# Patient Record
Sex: MALE | Race: OTHER | HISPANIC OR LATINO | URBAN - METROPOLITAN AREA
[De-identification: names, ages, dates, MRNs, and addresses within clinical notes are randomized per-mention and may not be internally consistent; named-entity substitution may affect disease eponyms.]

---

## 2019-06-05 ENCOUNTER — EMERGENCY (EMERGENCY)
Facility: HOSPITAL | Age: 39
LOS: 1 days | Discharge: HOME | End: 2019-06-05
Attending: EMERGENCY MEDICINE | Admitting: EMERGENCY MEDICINE
Payer: COMMERCIAL

## 2019-06-05 VITALS
TEMPERATURE: 98 F | HEART RATE: 53 BPM | SYSTOLIC BLOOD PRESSURE: 137 MMHG | OXYGEN SATURATION: 98 % | DIASTOLIC BLOOD PRESSURE: 82 MMHG | RESPIRATION RATE: 17 BRPM

## 2019-06-05 VITALS
HEART RATE: 51 BPM | OXYGEN SATURATION: 97 % | SYSTOLIC BLOOD PRESSURE: 152 MMHG | TEMPERATURE: 98 F | DIASTOLIC BLOOD PRESSURE: 85 MMHG | WEIGHT: 162.92 LBS | RESPIRATION RATE: 18 BRPM

## 2019-06-05 DIAGNOSIS — N20.0 CALCULUS OF KIDNEY: ICD-10-CM

## 2019-06-05 DIAGNOSIS — R10.9 UNSPECIFIED ABDOMINAL PAIN: ICD-10-CM

## 2019-06-05 LAB
ALBUMIN SERPL ELPH-MCNC: 4.7 G/DL — SIGNIFICANT CHANGE UP (ref 3.3–5)
ALP SERPL-CCNC: 129 U/L — HIGH (ref 40–120)
ALT FLD-CCNC: 106 U/L — HIGH (ref 10–45)
ANION GAP SERPL CALC-SCNC: 11 MMOL/L — SIGNIFICANT CHANGE UP (ref 5–17)
APPEARANCE UR: CLEAR — SIGNIFICANT CHANGE UP
AST SERPL-CCNC: 49 U/L — HIGH (ref 10–40)
BASOPHILS # BLD AUTO: 0.04 K/UL — SIGNIFICANT CHANGE UP (ref 0–0.2)
BASOPHILS NFR BLD AUTO: 0.6 % — SIGNIFICANT CHANGE UP (ref 0–2)
BILIRUB SERPL-MCNC: 0.6 MG/DL — SIGNIFICANT CHANGE UP (ref 0.2–1.2)
BILIRUB UR-MCNC: NEGATIVE — SIGNIFICANT CHANGE UP
BUN SERPL-MCNC: 15 MG/DL — SIGNIFICANT CHANGE UP (ref 7–23)
CALCIUM SERPL-MCNC: 9.7 MG/DL — SIGNIFICANT CHANGE UP (ref 8.4–10.5)
CHLORIDE SERPL-SCNC: 106 MMOL/L — SIGNIFICANT CHANGE UP (ref 96–108)
CO2 SERPL-SCNC: 26 MMOL/L — SIGNIFICANT CHANGE UP (ref 22–31)
COLOR SPEC: YELLOW — SIGNIFICANT CHANGE UP
CREAT SERPL-MCNC: 1.04 MG/DL — SIGNIFICANT CHANGE UP (ref 0.5–1.3)
DIFF PNL FLD: ABNORMAL
EOSINOPHIL # BLD AUTO: 0.3 K/UL — SIGNIFICANT CHANGE UP (ref 0–0.5)
EOSINOPHIL NFR BLD AUTO: 4.3 % — SIGNIFICANT CHANGE UP (ref 0–6)
GLUCOSE SERPL-MCNC: 123 MG/DL — HIGH (ref 70–99)
GLUCOSE UR QL: NEGATIVE — SIGNIFICANT CHANGE UP
HCT VFR BLD CALC: 46 % — SIGNIFICANT CHANGE UP (ref 39–50)
HGB BLD-MCNC: 15.1 G/DL — SIGNIFICANT CHANGE UP (ref 13–17)
IMM GRANULOCYTES NFR BLD AUTO: 0.3 % — SIGNIFICANT CHANGE UP (ref 0–1.5)
KETONES UR-MCNC: ABNORMAL MG/DL
LEUKOCYTE ESTERASE UR-ACNC: ABNORMAL
LYMPHOCYTES # BLD AUTO: 1.88 K/UL — SIGNIFICANT CHANGE UP (ref 1–3.3)
LYMPHOCYTES # BLD AUTO: 27.1 % — SIGNIFICANT CHANGE UP (ref 13–44)
MCHC RBC-ENTMCNC: 29.7 PG — SIGNIFICANT CHANGE UP (ref 27–34)
MCHC RBC-ENTMCNC: 32.8 GM/DL — SIGNIFICANT CHANGE UP (ref 32–36)
MCV RBC AUTO: 90.6 FL — SIGNIFICANT CHANGE UP (ref 80–100)
MONOCYTES # BLD AUTO: 0.58 K/UL — SIGNIFICANT CHANGE UP (ref 0–0.9)
MONOCYTES NFR BLD AUTO: 8.4 % — SIGNIFICANT CHANGE UP (ref 2–14)
NEUTROPHILS # BLD AUTO: 4.11 K/UL — SIGNIFICANT CHANGE UP (ref 1.8–7.4)
NEUTROPHILS NFR BLD AUTO: 59.3 % — SIGNIFICANT CHANGE UP (ref 43–77)
NITRITE UR-MCNC: NEGATIVE — SIGNIFICANT CHANGE UP
NRBC # BLD: 0 /100 WBCS — SIGNIFICANT CHANGE UP (ref 0–0)
PH UR: 5.5 — SIGNIFICANT CHANGE UP (ref 5–8)
PLATELET # BLD AUTO: 291 K/UL — SIGNIFICANT CHANGE UP (ref 150–400)
POTASSIUM SERPL-MCNC: 4.2 MMOL/L — SIGNIFICANT CHANGE UP (ref 3.5–5.3)
POTASSIUM SERPL-SCNC: 4.2 MMOL/L — SIGNIFICANT CHANGE UP (ref 3.5–5.3)
PROT SERPL-MCNC: 8.2 G/DL — SIGNIFICANT CHANGE UP (ref 6–8.3)
PROT UR-MCNC: NEGATIVE MG/DL — SIGNIFICANT CHANGE UP
RBC # BLD: 5.08 M/UL — SIGNIFICANT CHANGE UP (ref 4.2–5.8)
RBC # FLD: 13 % — SIGNIFICANT CHANGE UP (ref 10.3–14.5)
SODIUM SERPL-SCNC: 143 MMOL/L — SIGNIFICANT CHANGE UP (ref 135–145)
SP GR SPEC: 1.02 — SIGNIFICANT CHANGE UP (ref 1–1.03)
UROBILINOGEN FLD QL: 0.2 E.U./DL — SIGNIFICANT CHANGE UP
WBC # BLD: 6.93 K/UL — SIGNIFICANT CHANGE UP (ref 3.8–10.5)
WBC # FLD AUTO: 6.93 K/UL — SIGNIFICANT CHANGE UP (ref 3.8–10.5)

## 2019-06-05 PROCEDURE — 96361 HYDRATE IV INFUSION ADD-ON: CPT

## 2019-06-05 PROCEDURE — 99285 EMERGENCY DEPT VISIT HI MDM: CPT

## 2019-06-05 PROCEDURE — 85025 COMPLETE CBC W/AUTO DIFF WBC: CPT

## 2019-06-05 PROCEDURE — 36415 COLL VENOUS BLD VENIPUNCTURE: CPT

## 2019-06-05 PROCEDURE — 74176 CT ABD & PELVIS W/O CONTRAST: CPT | Mod: 26

## 2019-06-05 PROCEDURE — 87086 URINE CULTURE/COLONY COUNT: CPT

## 2019-06-05 PROCEDURE — 74176 CT ABD & PELVIS W/O CONTRAST: CPT

## 2019-06-05 PROCEDURE — 99284 EMERGENCY DEPT VISIT MOD MDM: CPT | Mod: 25

## 2019-06-05 PROCEDURE — 80053 COMPREHEN METABOLIC PANEL: CPT

## 2019-06-05 PROCEDURE — 96376 TX/PRO/DX INJ SAME DRUG ADON: CPT

## 2019-06-05 PROCEDURE — 96374 THER/PROPH/DIAG INJ IV PUSH: CPT

## 2019-06-05 PROCEDURE — 96375 TX/PRO/DX INJ NEW DRUG ADDON: CPT

## 2019-06-05 PROCEDURE — 81001 URINALYSIS AUTO W/SCOPE: CPT

## 2019-06-05 RX ORDER — SODIUM CHLORIDE 9 MG/ML
1000 INJECTION INTRAMUSCULAR; INTRAVENOUS; SUBCUTANEOUS ONCE
Refills: 0 | Status: COMPLETED | OUTPATIENT
Start: 2019-06-05 | End: 2019-06-05

## 2019-06-05 RX ORDER — MORPHINE SULFATE 50 MG/1
4 CAPSULE, EXTENDED RELEASE ORAL ONCE
Refills: 0 | Status: DISCONTINUED | OUTPATIENT
Start: 2019-06-05 | End: 2019-06-05

## 2019-06-05 RX ORDER — TAMSULOSIN HYDROCHLORIDE 0.4 MG/1
0.4 CAPSULE ORAL ONCE
Refills: 0 | Status: COMPLETED | OUTPATIENT
Start: 2019-06-05 | End: 2019-06-05

## 2019-06-05 RX ORDER — TAMSULOSIN HYDROCHLORIDE 0.4 MG/1
1 CAPSULE ORAL
Qty: 10 | Refills: 0
Start: 2019-06-05 | End: 2019-06-14

## 2019-06-05 RX ORDER — KETOROLAC TROMETHAMINE 30 MG/ML
30 SYRINGE (ML) INJECTION ONCE
Refills: 0 | Status: DISCONTINUED | OUTPATIENT
Start: 2019-06-05 | End: 2019-06-05

## 2019-06-05 RX ORDER — ONDANSETRON 8 MG/1
4 TABLET, FILM COATED ORAL ONCE
Refills: 0 | Status: COMPLETED | OUTPATIENT
Start: 2019-06-05 | End: 2019-06-05

## 2019-06-05 RX ORDER — IBUPROFEN 200 MG
1 TABLET ORAL
Qty: 21 | Refills: 0
Start: 2019-06-05 | End: 2019-06-11

## 2019-06-05 RX ADMIN — TAMSULOSIN HYDROCHLORIDE 0.4 MILLIGRAM(S): 0.4 CAPSULE ORAL at 13:52

## 2019-06-05 RX ADMIN — SODIUM CHLORIDE 2000 MILLILITER(S): 9 INJECTION INTRAMUSCULAR; INTRAVENOUS; SUBCUTANEOUS at 13:02

## 2019-06-05 RX ADMIN — Medication 30 MILLIGRAM(S): at 11:29

## 2019-06-05 RX ADMIN — SODIUM CHLORIDE 2000 MILLILITER(S): 9 INJECTION INTRAMUSCULAR; INTRAVENOUS; SUBCUTANEOUS at 11:22

## 2019-06-05 RX ADMIN — MORPHINE SULFATE 4 MILLIGRAM(S): 50 CAPSULE, EXTENDED RELEASE ORAL at 13:16

## 2019-06-05 RX ADMIN — ONDANSETRON 4 MILLIGRAM(S): 8 TABLET, FILM COATED ORAL at 11:22

## 2019-06-05 RX ADMIN — MORPHINE SULFATE 4 MILLIGRAM(S): 50 CAPSULE, EXTENDED RELEASE ORAL at 11:22

## 2019-06-05 RX ADMIN — SODIUM CHLORIDE 1000 MILLILITER(S): 9 INJECTION INTRAMUSCULAR; INTRAVENOUS; SUBCUTANEOUS at 12:32

## 2019-06-05 RX ADMIN — MORPHINE SULFATE 4 MILLIGRAM(S): 50 CAPSULE, EXTENDED RELEASE ORAL at 13:01

## 2019-06-05 NOTE — ED PROVIDER NOTE - CLINICAL SUMMARY MEDICAL DECISION MAKING FREE TEXT BOX
38 yo male with h/o kidney stones in the past, present to ER with pain on his right flank area since morning, also associated difficulty with urination. Pt afebrile, denies any vomiting. Labs and CT scan done, pt hydrated and pain has been under control. CT findings consistent with 3 mm stone at the right UVJ. results discussed with pt. will d/c home for further out pt urology f/u.  returned precautions discussed. pt verbalized understanding.

## 2019-06-05 NOTE — ED PROVIDER NOTE - CARE PROVIDER_API CALL
Benigno Sommers)  Urology  [address] [address]  Phone: [phone]  Fax: [phone]  Follow Up Time:

## 2019-06-05 NOTE — ED PROVIDER NOTE - PROGRESS NOTE DETAILS
Scribing for Doreen Kline (MD): 40 y/o M with no significant PMHx presents to the ED with complaints of R lower abdominal pain radiating to the back. Patient notes similar episode approximately 8 years ago. Patient reports sudden onset of pain with associated urinary urgency. Pain has improved in the ED with medications. Denies nausea, vomiting, diarrhea, SOB or any other acute complaints.

## 2019-06-05 NOTE — ED PROVIDER NOTE - NSFOLLOWUPCLINICS_GEN_ALL_ED_FT
[facility]  Urology  [address], 3rd [address]  [place], [place] [address]  Phone: [phone]  Fax:   Follow Up Time:

## 2019-06-05 NOTE — ED PROVIDER NOTE - OBJECTIVE STATEMENT
38 yo male with h/o kidney stones in the past, present to ER c/o severe right flank pain , radiating down to his RLQ and right groin, pt also  couldn't urinate since morning. Denies fever or chills, c/o nausea, no vomiting, no d/c, no blood in the stool or urine.  Pt states that pain is similar to his previous kidney stones attacks.

## 2019-06-05 NOTE — ED PROVIDER NOTE - ATTENDING CONTRIBUTION TO CARE
40 yo hx of renal stone w R flank, nausea, diaphoresis, Well appearing, nad, nc/at, lung cta, heart reg, abd soft, nt, ext no gross deformity, no gross neuro deficits, pending labs, UA 40 y/o M with no significant PMHx presents to the ED with complaints of R lower abdominal pain radiating to the back. Patient notes similar episode approximately 8 years ago. Patient reports sudden onset of pain with associated urinary urgency. Pain has improved in the ED with medications. Denies nausea, vomiting, diarrhea, SOB or any other acute complaints.  Well appearing, nad, nc/at, lung cta, heart reg, abd soft, nt, ext no gross deformity, no gross neuro deficits. Pending labs, CT and reeval. Pain controlled.

## 2019-06-05 NOTE — ED PROVIDER NOTE - NSFOLLOWUPINSTRUCTIONS_ED_ALL_ED_FT
I have discussed the discharge plan with the patient. The patient agrees with the plan, as discussed.  The patient understands Emergency Department diagnosis is a preliminary diagnosis often based on limited information and that the patient must adhere to the follow-up plan as discussed.  The patient understands that if the symptoms worsen or if prescribed medications do not have the desired/planned effect that the patient may return to the Emergency Department at any time for further evaluation and treatment.  ArabicBosnianCanadian [facility]    Recomendaciones dietéticas para prevenir la formación de cálculos renales  Dietary Guidelines to Help Prevent Kidney Stones  Introducción  Los cálculos renales son depósitos de minerales y sales que se silviano en el interior de los riñones. Cabral riesgo de tener cálculos renales puede ser mayor en función de cabral dieta, el estilo de kristin, los medicamentos que amor y la presencia de ciertas afecciones médicas. Las instrucciones que se detallan a continuación disminuyen las posibilidades de tener cálculos renales para la mayoría de las personas. Según cabral ericka general y el tipo de cálculos renales que dennis a desarrollar, cabral nutricionista puede darle indicaciones más específicas.    Consejos para seguir aliza plan  Lectura de las etiquetas de los alimentos     Elija alimentos con etiquetas que digan "sin sal agregada" o "bajo contenido de sal". Limite el consumo de sodio a menos de 1500 mg por día.  Elija alimentos con calcio para incluirlos en cada comida o refrigerio. Trate de incorporar 300 mg de calcio en cada comida. Entre los alimentos que contienen de 200 a 500 mg de calcio por porción, se pueden incluir los siguientes:  8 onzas (237 ml) de leche, leche no láctea fortificada y jugo de fruta fortificado.  8 onzas (237 ml) de kéfir, yogur y yogur de soja.  4 onzas (118 ml) de tofu.  1 onza de queso.  1 taza (300 g) de higos secos.  1 taza (91 g) de brócoli cocido.  1 zahira de 1 a 3 onzas de laquita o caballa.  La mayoría de las personas necesitan de 1000 a 1500 mg de calcio por día. Hable con cabral nutricionista sobre la cantidad de calcio que recomendaría para usted.  De compras     Compre mucha fruta y verdura fresca. La mayoría de las personas no debería evitar las frutas y verduras, aun cuando contengan nutrientes que puedan contribuir a formar cálculos renales.  Cuando compre alimentos semipreparados, elija los siguientes:  Frutas enteras.  Ensaladas preparadas con aderezo aparte.  Batidos de fruta y yogur descremados.  Evite comprar comidas congeladas o fiambres.  Busque alimentos con cultivos vivos, suinl yogur y kéfir.  Cocción     No agregue sal a los alimentos cuando cocine. Ponga un salero en la bruce y deje que cada persona agregue sal a cabral gusto.  Para las pastas, guisos y sopas, use proteínas vegetales, sunil frijoles, proteína vegetal texturada (PVT) o tofu en lugar de orlando.  Planificación de las comidas     Image   Si cabral nutricionista se lo indica, ingiera menos sal. Dayne lo siguiente:  Evite consumir alimentos prehechos o procesados.  Evite las comidas rápidas.  Coma menos proteínas animales, sunil queso, carne de joy, ave o pescado, si así se lo indica cabral nutricionista. Dayne lo siguiente:  Limite la cantidad de veces que ingiere carne de joy, ave, pescado, o queso por semana. Siga netta dieta sin carne vacuna, al menos 2 días por semana.  Coma solo netta porción por día de carne de joy, ave, pescado o mariscos.  Cuando prepare proteínas animales, penelope los trozos en porciones pequeñas. En términos generales, entta porción de carne vacuna o de aves tiene sky el tamaño de un janet de cartas.  Coma al menos 5 porciones de frutas frescas y verduras por día. Dayne lo siguiente:  Tenga a mano frutas y verduras para los refrigerios.  Coma netta fruta o un puñado de partha rojos con el desayuno.  Coma netta ensalada y fruta en el almuerzo.  Incorpore dos clases de verduras en la al.  Limite los alimentos con alto contenido de netta sustancia llamada oxalato. Estos incluyen los siguientes:  [place].  Ruibarbo.  Remolachas.  Prasanna fritas y prasanna fritas de bolsa.  Partha secos.  Si amor diuréticos regularmente, asegúrese de comer al menos 1 o 2 frutas o verduras con alto contenido de potasio todos los días. Estos incluyen los siguientes:  Aguacate.  Banana.  Brayton, ciruela pasa, zanahoria, o jugo de tomate.  Patata al horno.  Repollo.  Frijoles y arvejas partidas.  Instrucciones generales     Image   Jeana suficiente líquido para mantener la orina anabel o de color amarillo pálido. [place] es lo más importante que puede hacer.  Hable con cabral médico y cabral nutricionista sobre janelle suplementos diarios. En función de cabral ericka y de la causa de valeri cálculos renales, usted puede recibir las siguientes recomendaciones:  No janelle suplementos con vitamina C.  Janelle un suplemento de calcio.  Janelle un suplemento probiótico diario.  Janelle otros suplementos sunil magnesio, aceite de pescado o vitamina B6.  [place] todos los antibióticos y suplementos sunil se lo haya indicado el médico.  Limite el consumo de alcohol a no más de 1 medida por día si es fredy y no está embarazada, y 2 medidas por día si es hombre. Netta medida equivale a 12 oz (355 ml) de cerveza, 5 oz (148 ml) de vino o 1½ oz (44 ml) de bebidas alcohólicas de grayson graduación.  Si cabral médico se lo indica, baje de peso. Trabaje con cabral nutricionista para encontrar las estrategias y el plan de alimentación que mejor funcione para usted.  ¿Qué alimentos no se recomiendan?  Limite la ingesta de los siguientes alimentos, o según se lo indique cabral nutricionista. Hable con cabral nutricionista sobre los alimentos específicos que debería evitar de acuerdo con el tipo de cálculos renales que tiene y cabral estado de ericka general.    Cereales     Panes. Rosquillas. Bollos. Panificados. Galletas saladas. Cereales. Pastas.    Verduras     Espinaca. Ruibarbo. Remolachas. Verduras enlatadas. Pepinillos. [place].    Orlando y otros alimentos proteicos     Partha secos. Mantequilla de partha secos. Porciones grandes de carne vacuna, de ave o de pescado. Embutidos y orlando saladas. Fiambres. Perros calientes. Salchichas.    Lácteos     Queso.    Refrescos     Refrescos regulares. Jugo de verduras regular.    Condimentos y otros alimentos     Mezclas de condimentos con sal. Condimentos para ensalada. Sopas enlatadas. Salsa de soja. Kétchup. Salsa barbacoa. Salsa en zahira para pastas. Guisos. Pizza. Lasaña. Comidas congeladas. Prasanna fritas en bolsa. Prasanna fritas.    Resumen  Puede reducir el riesgo de tener cálculos renales si introduce cambios en cabral dieta.  Lo más importante es que jeana mucho líquido. Debe beber suficiente cantidad de líquido para mantener la orina anabel o de color amarillo pálido.  Pregúntele a cabral médico o nutricionista qué cantidad de proteínas de origen animal debería consumir cada día, y también qué cantidad de sal y calcio debe ingerir.  Esta información no tiene sunil fin reemplazar el consejo del médico. Asegúrese de hacerle al médico cualquier pregunta que tenga.    Document Released: 09/11/2013 Document Revised: 04/09/2018 Document Reviewed: 04/09/2018  Monitoring Division Interactive Patient Education © 2019 Elsevier Inc.      ArabicBosnianCanadian FrenchEnglishHaitian CreoleKoreanHealthSouth Rehabilitation Hospital of Southern ArizonaishAlbuquerque Indian Health CenterishTagalogTraditional ChineseVietnamese    Cálculos renales  Kidney Stones  Introducción  Image   Los cálculos renales (urolitiasis) son depósitos sólidos parecidos a netta kendall que se silviano dentro de los órganos que producen la orina (riñones). Un cálculo renal puede formarse en un riñón y desplazarse a la vejiga, donde puede causar dolor intenso y obstruir el flujo de orina. Los cálculos renales se silviano cuando hay niveles altos de ciertos minerales presentes en la orina. Suelen eliminarse a través de la orina, arely, en algunos casos, se necesita tratamiento médico para eliminarlos.    ¿Cuáles son las causas?  Los cálculos renales pueden ser causados por lo siguiente:  Netta afección en la cual ciertas glándulas producen mucha hormona paratiroidea (hiperparatiroidismo primario), lo que causa demasiada acumulación de calcio en la everardo.  La acumulación de viktoriya de ácido úrico en la vejiga (hiperuricuria). El ácido úrico es un químico que el cuerpo produce cuando se ingieren determinados alimentos. Suele eliminarse a través de la orina.  El estrechamiento (estenosis) de los conductos que drenan orina de los riñones a la vejiga (uréteres).  Netta obstrucción en el riñón presente al nacer (obstrucción congénita).  Netta cirugía realizada en el riñón o los uréteres, sunil netta cirugía de revascularización.  ¿Qué incrementa el riesgo?  Los siguientes factores pueden hacer que usted sea propenso a formar cálculos renales:  [place] tenido un cálculo renal en el pasado.  Tener antecedentes familiares de cálculos renales.  No beber suficiente agua.  Seguir netta dieta emmanuel en proteínas, sal (sodio) o azúcar.  Tener exceso de peso u obesidad.  ¿Cuáles son los signos o los síntomas?  Los síntomas de cálculos renales pueden incluir los siguientes:  Náuseas.  Vómitos.  Everardo en la orina (hematuria).  Dolor en el costado del abdomen, bandar debajo de las costillas (dolor en fosa lumbar). Dolor que generalmente se expande (irradia) hacia la patrizia.  Necesidad de orinar con frecuencia o urgencia.  ¿Cómo se diagnostica?  Esta afección se puede diagnosticar en función de lo siguiente:  Valeri antecedentes médicos.  Un examen físico.  Análisis de everardo.  Análisis de orina.  Exploración por tomografía computarizada (TC).  Radiografía abdominal.  Netta intervención para examinar el interior de la vejiga (cistoscopia).  ¿Cómo se trata?  El tratamiento para los cálculos renales depende del tamaño, la ubicación y la composición de los cálculos. El tratamiento puede incluir lo siguiente:  Analizar la orina antes y después de eliminar el cálculo a través de la orina.  Supervisión en el hospital hasta eliminar el cálculo a través de la orina.  Aumentar la ingesta de líquidos y disminuir la cantidad de calcio y proteínas en la dieta.  Netta intervención para romper los cálculos en la vejiga utilizando lo siguiente:  Un haz de parminder concentrado (terapia láser).  Ondas de choque (litotricia extracorpórea).  Cirugía para extraer los cálculos renales. [place] será necesario si siente dolor intenso o los cálculos obstruyen las vías urinarias.  Siga estas indicaciones en cabral casa:  Comida y bebida     Jeana suficiente líquido sunil para mantener la orina anabel o de color amarillo pálido. [place] lo ayudará a eliminar el cálculo renal.  Si se lo indican, modifique la dieta. Estos pueden incluir lo siguiente:  Limitar la ingesta de sodio.  [place] más frutas y verduras.  Limitar la ingesta de carne de res, carne de aves de hermosillo, pescado y huevos.  Siga las indicaciones del médico respecto de las restricciones de comidas o bebidas.  Instrucciones generales     Recoja netta muestra de orina sunil se lo haya indicado el médico. Deberá recoger netta muestra de orina:  24 horas después de eliminar el cálculo.  Entre 8 y 12 semanas después de latia eliminado el cálculo, y cada 6 a 12 meses después de haberlo eliminado.  Cuele la orina cada vez que orine según las indicaciones del médico. Use el colador que el médico le haya recomendado.  No deseche el cálculo renal después de haberlo eliminado. Consérvelo para que el médico pueda analizarlo. Analizar la composición del cálculo renal puede evitar la formación de posibles cálculos renales en el futuro.  [place] los medicamentos de venta corwin y los recetados solamente sunil se lo haya indicado el médico.  Concurra a todas las visitas de seguimiento sunil se lo haya indicado el médico. [place] es importante. Es posible que le realicen radiografías o ecografías para asegurarse de que haya eliminado el cálculo.  ¿Cómo se eliceo?  ImagePara prevenir otro cálculo renal:  Jeana suficiente líquido sunil para mantener la orina anabel o de color amarillo pálido. Esta es la mejor manera de prevenir la formación de cálculos renales.  Siga netta dieta saludable y las recomendaciones de cabral médico respecto de los alimentos que debe evitar. Es posible que le indiquen que siga netta dieta baja en proteínas. Las recomendaciones varían según el tipo de cálculo renal que tenga.  Mantenga un peso saludable.  Comuníquese con un médico si:  Tiene un dolor que empeora o que no mejora con los medicamentos.  Solicite ayuda de inmediato si:  Tiene fiebre o siente escalofríos.  Siente dolor intenso.  Siente dolor abdominal.  Se desmaya.  No puede orinar.  Esta información no tiene sunil fin reemplazar el consejo del médico. Asegúrese de hacerle al médico cualquier pregunta que tenga.    Document Released: 12/18/2006 Document Revised: 05/31/2018 Document Reviewed: 06/02/2017  Elsevier Interactive Patient Education © 2019 Elsevier Inc.      EnglishSpanish    Cálculos renales  Kidney Stones  Introducción  Image   Los cálculos renales (urolitiasis) son masas sólidas que se silviano en el interior de los riñones. Los riñones son los órganos que producen el pis (la orina). Un cálculo renal puede provocar un dolor muy intenso y obstruir el flujo de pis. Por lo general, el cálculo sale del cuerpo (se elimina) a través del pis. Es posible que un médico deba extraerle el cálculo.    Siga estas indicaciones en cabral casa:  Comida y bebida     Jeana suficiente líquido sunil para mantener el pis jennyfer o de color amarillo pálido. [place] lo ayudará a eliminar el cálculo renal.  Si el médico se lo indica, dayne cambios en los alimentos que consume (cabral dieta). Estos pueden incluir lo siguiente:  Limitar la cantidad de sal (sodio) que consume.  [place] más frutas y verduras.  Limitar la ingesta de carne de res, carne de ave, pescado y huevos.  Siga las indicaciones del médico respecto de las restricciones en las comidas o las bebidas.  Instrucciones generales     Recolecte las muestras de pis sunil se lo haya indicado el médico. Deberá recolectar netta muestra de pis en los siguientes momentos:  24 horas después de eliminar un cálculo.  Entre 8 y 12 semanas después de latia eliminado el cálculo, y cada 6 a 12 meses después de eso.  Cuele el pis cada vez que dayne pis (orine) lou el tiempo que le indiquen. Use el colador que le recomiende el médico.  No deseche el cálculo. Consérvelo para que el médico lo pueda analizar.  [place] los medicamentos de venta corwin y los recetados solamente sunil se lo haya indicado el médico.  Concurra a todas las visitas de seguimiento sunil se lo haya indicado el médico. [place] es importante. Es posible que deba realizarse exámenes de seguimiento.  Prevención de la formación de cálculos renales     Para prevenir la formación de otro cálculo renal:  Jeana suficiente líquido sunil para mantener el pis jennyfer o de color amarillo pálido. Esta es la mejor manera de prevenir la formación de cálculos renales.  Consuma alimentos saludables.  Evite determinados alimentos según se lo haya indicado el médico. Es posible que le indiquen que consuma menos proteínas.  Mantenga un peso saludable.  Comuníquese con un médico si:  Tiene un dolor que empeora o que no mejora con los medicamentos.  Solicite ayuda de inmediato si:  Tiene fiebre o siente escalofríos.  Siente un dolor muy intenso.  Tiene un dolor nuevo en el vientre (abdomen).  Pierde el conocimiento (se desmaya).  No puede hacer pis.  Esta información no tiene sunil fin reemplazar el consejo del médico. Asegúrese de hacerle al médico cualquier pregunta que tenga.    Document Released: 03/16/2010 Document Revised: 09/13/2018 Document Reviewed: 06/02/2017  Monitoring Division Interactive Patient Education © 2019 Elsevier Inc.

## 2019-06-05 NOTE — ED ADULT NURSE NOTE - OBJECTIVE STATEMENT
38 y/o male c/o R sided flank pain since 9am this morning. hx of kidney stones. also c/o pain on urination.

## 2019-06-06 LAB
CULTURE RESULTS: NO GROWTH — SIGNIFICANT CHANGE UP
SPECIMEN SOURCE: SIGNIFICANT CHANGE UP

## 2019-06-07 ENCOUNTER — APPOINTMENT (OUTPATIENT)
Dept: UROLOGY | Facility: CLINIC | Age: 39
End: 2019-06-07

## 2019-06-07 ENCOUNTER — OUTPATIENT (OUTPATIENT)
Dept: OUTPATIENT SERVICES | Facility: HOSPITAL | Age: 39
LOS: 1 days | End: 2019-06-07

## 2019-06-07 VITALS
RESPIRATION RATE: 15 BRPM | BODY MASS INDEX: 29.44 KG/M2 | SYSTOLIC BLOOD PRESSURE: 114 MMHG | DIASTOLIC BLOOD PRESSURE: 75 MMHG | HEART RATE: 57 BPM | HEIGHT: 62 IN | WEIGHT: 160 LBS | TEMPERATURE: 98.8 F

## 2019-06-07 DIAGNOSIS — N20.1 CALCULUS OF URETER: ICD-10-CM

## 2019-06-07 DIAGNOSIS — Z78.9 OTHER SPECIFIED HEALTH STATUS: ICD-10-CM

## 2019-06-07 DIAGNOSIS — N28.9 DISORDER OF KIDNEY AND URETER, UNSPECIFIED: ICD-10-CM

## 2019-06-07 PROBLEM — Z00.00 ENCOUNTER FOR PREVENTIVE HEALTH EXAMINATION: Status: ACTIVE | Noted: 2019-06-07

## 2019-06-07 LAB
APPEARANCE UR: CLEAR — SIGNIFICANT CHANGE UP
BILIRUB UR-MCNC: NEGATIVE — SIGNIFICANT CHANGE UP
COLOR SPEC: YELLOW — SIGNIFICANT CHANGE UP
DIFF PNL FLD: ABNORMAL
EPI CELLS # UR: SIGNIFICANT CHANGE UP /HPF (ref 0–5)
GLUCOSE UR QL: NEGATIVE — SIGNIFICANT CHANGE UP
KETONES UR-MCNC: NEGATIVE — SIGNIFICANT CHANGE UP
LEUKOCYTE ESTERASE UR-ACNC: NEGATIVE — SIGNIFICANT CHANGE UP
NITRITE UR-MCNC: NEGATIVE — SIGNIFICANT CHANGE UP
PH UR: 5.5 — SIGNIFICANT CHANGE UP (ref 5–8)
PROT UR-MCNC: NEGATIVE MG/DL — SIGNIFICANT CHANGE UP
RBC CASTS # UR COMP ASSIST: < 5 /HPF — SIGNIFICANT CHANGE UP
SP GR SPEC: 1.02 — SIGNIFICANT CHANGE UP (ref 1–1.03)
URATE CRY FLD QL MICRO: ABNORMAL /HPF
UROBILINOGEN FLD QL: 0.2 E.U./DL — SIGNIFICANT CHANGE UP
WBC UR QL: < 5 /HPF — SIGNIFICANT CHANGE UP

## 2019-06-08 LAB
CULTURE RESULTS: NO GROWTH — SIGNIFICANT CHANGE UP
SPECIMEN SOURCE: SIGNIFICANT CHANGE UP

## 2019-06-10 DIAGNOSIS — N20.1 CALCULUS OF URETER: ICD-10-CM

## 2019-06-11 NOTE — PHYSICAL EXAM
[General Appearance - Well Developed] : well developed [General Appearance - Well Nourished] : well nourished [Normal Appearance] : normal appearance [Well Groomed] : well groomed [General Appearance - In No Acute Distress] : no acute distress [Edema] : no peripheral edema [Respiration, Rhythm And Depth] : normal respiratory rhythm and effort [Exaggerated Use Of Accessory Muscles For Inspiration] : no accessory muscle use [Abdomen Tenderness] : non-tender [Abdomen Soft] : soft [Costovertebral Angle Tenderness] : no ~M costovertebral angle tenderness [Urethral Meatus] : meatus normal [Scrotum] : the scrotum was normal [Urinary Bladder Findings] : the bladder was normal on palpation [Testes Mass (___cm)] : there were no testicular masses [Normal Station and Gait] : the gait and station were normal for the patient's age [No Focal Deficits] : no focal deficits [] : no rash [Oriented To Time, Place, And Person] : oriented to person, place, and time [Affect] : the affect was normal [Not Anxious] : not anxious [Mood] : the mood was normal [No Palpable Adenopathy] : no palpable adenopathy

## 2019-06-12 NOTE — ASSESSMENT
[FreeTextEntry1] : 39M with right ureteral stone, likely passed\par \par -obtain renal US to ensure stone passage and rule out silent obstruction\par -RTC after Renal sono

## 2019-06-12 NOTE — HISTORY OF PRESENT ILLNESS
[FreeTextEntry1] : 39M with no past medical history presents for follow up for 3mm right UVJ stone. Patient had new onset right flank pain at his job as . Went to ER CT showed 3mm right UVJ stone. Since then pain has resolved and he thinks he saw small stone in toilet. No nausea, no fevers, no dysuria, no gross heamturia. \par \par No family history of kidney, bladder, or prostate cancer [Urinary Incontinence] : no urinary incontinence [Urinary Retention] : no urinary retention [Urinary Urgency] : no urinary urgency [Urinary Frequency] : no urinary frequency [Nocturia] : no nocturia [Straining] : no straining

## 2023-10-01 PROBLEM — N28.9 KIDNEY DISORDER: Status: ACTIVE | Noted: 2019-06-07
